# Patient Record
Sex: FEMALE | Race: WHITE | NOT HISPANIC OR LATINO | Employment: FULL TIME | URBAN - METROPOLITAN AREA
[De-identification: names, ages, dates, MRNs, and addresses within clinical notes are randomized per-mention and may not be internally consistent; named-entity substitution may affect disease eponyms.]

---
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
## 2023-05-02 PROCEDURE — 82607 VITAMIN B-12: CPT | Performed by: FAMILY MEDICINE

## 2023-05-02 PROCEDURE — 83550 IRON BINDING TEST: CPT | Performed by: FAMILY MEDICINE

## 2023-05-02 PROCEDURE — 82728 ASSAY OF FERRITIN: CPT | Performed by: FAMILY MEDICINE

## 2024-07-02 ENCOUNTER — OFFICE VISIT (OUTPATIENT)
Dept: URGENT CARE | Facility: CLINIC | Age: 19
End: 2024-07-02
Payer: COMMERCIAL

## 2024-07-02 VITALS
OXYGEN SATURATION: 100 % | RESPIRATION RATE: 18 BRPM | TEMPERATURE: 97 F | BODY MASS INDEX: 22.47 KG/M2 | WEIGHT: 119 LBS | HEART RATE: 65 BPM | SYSTOLIC BLOOD PRESSURE: 106 MMHG | HEIGHT: 61 IN | DIASTOLIC BLOOD PRESSURE: 72 MMHG

## 2024-07-02 DIAGNOSIS — K59.00 CONSTIPATION, UNSPECIFIED CONSTIPATION TYPE: Primary | ICD-10-CM

## 2024-07-02 DIAGNOSIS — R10.9 ABDOMINAL PAIN, UNSPECIFIED ABDOMINAL LOCATION: ICD-10-CM

## 2024-07-02 RX ORDER — DESVENLAFAXINE 100 MG/1
100 TABLET, EXTENDED RELEASE ORAL
COMMUNITY
Start: 2024-01-31

## 2024-07-02 RX ORDER — DICYCLOMINE HYDROCHLORIDE 20 MG/1
20 TABLET ORAL 3 TIMES DAILY PRN
Qty: 45 TABLET | Refills: 0 | Status: SHIPPED | OUTPATIENT
Start: 2024-07-02 | End: 2024-08-01

## 2024-07-02 RX ORDER — DEXTROAMPHETAMINE SACCHARATE, AMPHETAMINE ASPARTATE MONOHYDRATE, DEXTROAMPHETAMINE SULFATE AND AMPHETAMINE SULFATE 6.25; 6.25; 6.25; 6.25 MG/1; MG/1; MG/1; MG/1
CAPSULE, EXTENDED RELEASE ORAL EVERY MORNING
COMMUNITY

## 2024-07-02 NOTE — PATIENT INSTRUCTIONS
For abdominal cramping, take dicyclomine 3 times daily as needed.      Recommend over-the-counter laxative such as MiraLax to help have a bowel movement.  Recommend that you increase fluid intake and eat regular meals as tolerated.      You may continue taking Zofran as needed for nausea.      For any worsening pain, fevers/chills please consider going to the ER.      Return to urgent Care as needed.

## 2024-07-02 NOTE — PROGRESS NOTES
"Subjective:      Patient ID: Luis Bass is a 18 y.o. female.    Vitals:  height is 5' 1" (1.549 m) and weight is 54 kg (119 lb). Her temperature is 97.4 °F (36.3 °C). Her blood pressure is 106/72 and her pulse is 65. Her respiration is 18 and oxygen saturation is 100%.     Chief Complaint: Abdominal Pain     Patient is a 18 y.o. female who presents to urgent care with complaints of abdominal pain, mid abdominal feels like constipation, last bowel today started this AM  Alleviating factors include Aleve and zofran with no relief. Patient denies fever.        Constitution: Positive for sweating. Negative for fever.   Cardiovascular:  Negative for chest pain.   Respiratory:  Negative for shortness of breath.    Gastrointestinal:  Positive for abdominal pain, nausea and constipation. Negative for vomiting and diarrhea.      Objective:     Physical Exam   Constitutional: She is oriented to person, place, and time.  Non-toxic appearance. She does not appear ill. No distress. normal  HENT:   Head: Normocephalic and atraumatic.   Nose: Nose normal.   Mouth/Throat: Mucous membranes are moist.   Eyes: Conjunctivae are normal. Extraocular movement intact   Cardiovascular: Normal rate.   Pulmonary/Chest: Breath sounds normal. No respiratory distress.   Abdominal: Normal appearance and bowel sounds are normal. She exhibits no distension. Soft. flat abdomen There is abdominal tenderness. There is no rebound, no guarding, no left CVA tenderness and no right CVA tenderness.      Comments: TTP to LLQ and suprapubic region   Musculoskeletal: Normal range of motion.         General: Normal range of motion.   Neurological: no focal deficit. She is alert and oriented to person, place, and time.   Skin: Skin is warm and dry.   Psychiatric: Mood and thought content normal.   Nursing note and vitals reviewed.      Assessment:     1. Constipation, unspecified constipation type    2. Abdominal pain, unspecified abdominal location  " "      Plan:       Constipation, unspecified constipation type    Abdominal pain, unspecified abdominal location    Other orders  -     dicyclomine (BENTYL) 20 mg tablet; Take 1 tablet (20 mg total) by mouth 3 (three) times daily as needed (abdominal cramping).  Dispense: 45 tablet; Refill: 0          Medical Decision Making:   Initial Assessment:     18-year-old female presents to the urgent care for evaluation mid lower abdominal pain x3 hours.  Patient reports sudden onset of pain this morning when she was cleaning around the home.  She localizes her pain to the suprapubic region of the abdomen and left lower quadrant.  She describes the pain as intermittent and cramping.  She reports that she tried to go have a bowel movement at time of onset and only passed small, pebble like stool.  She describes the pain currently as coming in "waves" and feeling like she has to have a bowel movement.  She does report that at onset of pain she did have some nausea, without vomiting.  She also reports feelings of diaphoresis at that time.  She reports that her nausea and diaphoresis have resolved at this time.  She denies any fever or chills.  She denies any dysuria or urinary  symptoms.  She denies any vaginal bleeding or abnormal discharge.  Differential Diagnosis:     UTI, constipation, appendicitis, diverticulitis, pregnancy  Urgent Care Management:   On physical exam, the patient is well-appearing.  She does not have any acute abdomen symptoms or signs on physical exam.  Based on history and physical exam, suspect constipation with subsequent abdominal cramping.  Recommend treatment with Bentyl and over-the-counter MiraLax.  I did recommend if the patient does have recurrent nausea/ vomiting she may continue taking Zofran, however, she should consider going to the ER.  At this time, I do not believe that an ER visit is warranted, however, we did have strict return to ER precautions given her worsening symptoms at onset.  " Recommend that she increase fluid intake and return to urgent Care as needed.